# Patient Record
Sex: FEMALE
[De-identification: names, ages, dates, MRNs, and addresses within clinical notes are randomized per-mention and may not be internally consistent; named-entity substitution may affect disease eponyms.]

---

## 2018-08-03 ENCOUNTER — HOSPITAL ENCOUNTER (OUTPATIENT)
Dept: HOSPITAL 62 - LAB | Age: 23
End: 2018-08-03
Attending: NURSE PRACTITIONER
Payer: COMMERCIAL

## 2018-08-03 DIAGNOSIS — N89.8: Primary | ICD-10-CM

## 2018-08-03 LAB
BACTERIA (WET MOUNT): (no result)
EPITHELIALS (WET MOUNT): (no result)
T.VAGINALIS (WET MOUNT): (no result)
WBCS (WET MOUNT): (no result)
YEAST (WET MOUNT): (no result)

## 2018-08-03 PROCEDURE — 87210 SMEAR WET MOUNT SALINE/INK: CPT

## 2019-01-01 ENCOUNTER — HOSPITAL ENCOUNTER (OUTPATIENT)
Dept: HOSPITAL 62 - LAB | Age: 24
End: 2019-01-01
Attending: NURSE PRACTITIONER
Payer: COMMERCIAL

## 2019-01-01 DIAGNOSIS — R30.0: ICD-10-CM

## 2019-01-01 DIAGNOSIS — N89.8: Primary | ICD-10-CM

## 2019-01-01 LAB
BACTERIA (WET MOUNT): (no result)
CHLAM PCR: NOT DETECTED
EPITHELIALS (WET MOUNT): (no result)
GON PCR: NOT DETECTED
RBCS (WET MOUNT): (no result)
T.VAGINALIS (WET MOUNT): (no result)
WBCS (WET MOUNT): (no result)
YEAST (WET MOUNT): (no result)

## 2019-01-01 PROCEDURE — 87086 URINE CULTURE/COLONY COUNT: CPT

## 2019-01-01 PROCEDURE — 87186 SC STD MICRODIL/AGAR DIL: CPT

## 2019-01-01 PROCEDURE — 87591 N.GONORRHOEAE DNA AMP PROB: CPT

## 2019-01-01 PROCEDURE — 87491 CHLMYD TRACH DNA AMP PROBE: CPT

## 2019-01-01 PROCEDURE — 87210 SMEAR WET MOUNT SALINE/INK: CPT

## 2019-01-01 PROCEDURE — 87088 URINE BACTERIA CULTURE: CPT

## 2019-01-13 ENCOUNTER — HOSPITAL ENCOUNTER (OUTPATIENT)
Dept: HOSPITAL 62 - LAB | Age: 24
End: 2019-01-13
Attending: NURSE PRACTITIONER
Payer: COMMERCIAL

## 2019-01-13 DIAGNOSIS — R30.0: Primary | ICD-10-CM

## 2019-01-13 PROCEDURE — 87086 URINE CULTURE/COLONY COUNT: CPT

## 2020-04-10 ENCOUNTER — HOSPITAL ENCOUNTER (EMERGENCY)
Dept: HOSPITAL 62 - ER | Age: 25
Discharge: HOME | End: 2020-04-10
Payer: COMMERCIAL

## 2020-04-10 ENCOUNTER — HOSPITAL ENCOUNTER (OUTPATIENT)
Dept: HOSPITAL 62 - LC | Age: 25
Setting detail: OBSERVATION
LOS: 1 days | Discharge: HOME | End: 2020-04-11
Attending: OBSTETRICS & GYNECOLOGY | Admitting: OBSTETRICS & GYNECOLOGY
Payer: COMMERCIAL

## 2020-04-10 VITALS — SYSTOLIC BLOOD PRESSURE: 127 MMHG | DIASTOLIC BLOOD PRESSURE: 71 MMHG

## 2020-04-10 DIAGNOSIS — M79.10: ICD-10-CM

## 2020-04-10 DIAGNOSIS — O9A.212: ICD-10-CM

## 2020-04-10 DIAGNOSIS — S30.1XXA: ICD-10-CM

## 2020-04-10 DIAGNOSIS — S20.212A: ICD-10-CM

## 2020-04-10 DIAGNOSIS — R07.89: ICD-10-CM

## 2020-04-10 DIAGNOSIS — O9A.212: Primary | ICD-10-CM

## 2020-04-10 DIAGNOSIS — O26.892: ICD-10-CM

## 2020-04-10 DIAGNOSIS — R71.0: ICD-10-CM
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
## 2020-04-10 DIAGNOSIS — Z3A.22: ICD-10-CM
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
## 2020-04-10 DIAGNOSIS — R10.9: ICD-10-CM

## 2020-04-10 DIAGNOSIS — S80.212A: ICD-10-CM

## 2020-04-10 DIAGNOSIS — S00.83XA: ICD-10-CM

## 2020-04-10 DIAGNOSIS — S10.83XA: ICD-10-CM
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
## 2020-04-10 DIAGNOSIS — S93.402A: ICD-10-CM

## 2020-04-10 DIAGNOSIS — R51: ICD-10-CM
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
## 2020-04-10 DIAGNOSIS — S93.602A: ICD-10-CM

## 2020-04-10 DIAGNOSIS — O23.42: Primary | ICD-10-CM

## 2020-04-10 DIAGNOSIS — S90.812A: ICD-10-CM

## 2020-04-10 LAB
ADD MANUAL DIFF: NO
ADD MANUAL DIFF: NO
ANION GAP SERPL CALC-SCNC: 5 MMOL/L (ref 5–19)
APPEARANCE UR: (no result)
APPEARANCE UR: CLEAR
APTT BLD: 26.6 SEC (ref 23.5–35.8)
APTT PPP: YELLOW S
APTT PPP: YELLOW S
BARBITURATES UR QL SCN: NEGATIVE
BASOPHILS # BLD AUTO: 0 10^3/UL (ref 0–0.2)
BASOPHILS # BLD AUTO: 0 10^3/UL (ref 0–0.2)
BASOPHILS NFR BLD AUTO: 0.1 % (ref 0–2)
BASOPHILS NFR BLD AUTO: 0.1 % (ref 0–2)
BILIRUB UR QL STRIP: NEGATIVE
BILIRUB UR QL STRIP: NEGATIVE
BUN SERPL-MCNC: 7 MG/DL (ref 7–20)
CALCIUM: 9.2 MG/DL (ref 8.4–10.2)
CHLORIDE SERPL-SCNC: 107 MMOL/L (ref 98–107)
CO2 SERPL-SCNC: 22 MMOL/L (ref 22–30)
D DIMER PPP FEU-MCNC: 1.45 UG/ML (ref 0–0.5)
EOSINOPHIL # BLD AUTO: 0 10^3/UL (ref 0–0.6)
EOSINOPHIL # BLD AUTO: 0 10^3/UL (ref 0–0.6)
EOSINOPHIL NFR BLD AUTO: 0.1 % (ref 0–6)
EOSINOPHIL NFR BLD AUTO: 0.2 % (ref 0–6)
ERYTHROCYTE [DISTWIDTH] IN BLOOD BY AUTOMATED COUNT: 12.9 % (ref 11.5–14)
ERYTHROCYTE [DISTWIDTH] IN BLOOD BY AUTOMATED COUNT: 13 % (ref 11.5–14)
ERYTHROCYTE [DISTWIDTH] IN BLOOD BY AUTOMATED COUNT: 13 % (ref 11.5–14)
FIBRINOGEN PPP-MCNC: 405 MG/DL (ref 209–497)
GLUCOSE SERPL-MCNC: 95 MG/DL (ref 75–110)
GLUCOSE UR STRIP-MCNC: NEGATIVE MG/DL
GLUCOSE UR STRIP-MCNC: NEGATIVE MG/DL
HCT VFR BLD CALC: 28.2 % (ref 36–47)
HCT VFR BLD CALC: 29.6 % (ref 36–47)
HCT VFR BLD CALC: 32 % (ref 36–47)
HGB BLD-MCNC: 10.1 G/DL (ref 12–15.5)
HGB BLD-MCNC: 10.7 G/DL (ref 12–15.5)
HGB BLD-MCNC: 11.4 G/DL (ref 12–15.5)
INR PPP: 1.02
KETONES UR STRIP-MCNC: 20 MG/DL
KETONES UR STRIP-MCNC: NEGATIVE MG/DL
LYMPHOCYTES # BLD AUTO: 1.3 10^3/UL (ref 0.5–4.7)
LYMPHOCYTES # BLD AUTO: 1.6 10^3/UL (ref 0.5–4.7)
LYMPHOCYTES NFR BLD AUTO: 13.3 % (ref 13–45)
LYMPHOCYTES NFR BLD AUTO: 13.4 % (ref 13–45)
MCH RBC QN AUTO: 31.4 PG (ref 27–33.4)
MCH RBC QN AUTO: 31.4 PG (ref 27–33.4)
MCH RBC QN AUTO: 31.7 PG (ref 27–33.4)
MCHC RBC AUTO-ENTMCNC: 35.6 G/DL (ref 32–36)
MCHC RBC AUTO-ENTMCNC: 35.9 G/DL (ref 32–36)
MCHC RBC AUTO-ENTMCNC: 36 G/DL (ref 32–36)
MCV RBC AUTO: 87 FL (ref 80–97)
MCV RBC AUTO: 88 FL (ref 80–97)
MCV RBC AUTO: 88 FL (ref 80–97)
METHADONE UR QL SCN: NEGATIVE
MONOCYTES # BLD AUTO: 0.5 10^3/UL (ref 0.1–1.4)
MONOCYTES # BLD AUTO: 0.6 10^3/UL (ref 0.1–1.4)
MONOCYTES NFR BLD AUTO: 4.8 % (ref 3–13)
MONOCYTES NFR BLD AUTO: 4.9 % (ref 3–13)
NEUTROPHILS # BLD AUTO: 10 10^3/UL (ref 1.7–8.2)
NEUTROPHILS # BLD AUTO: 8.1 10^3/UL (ref 1.7–8.2)
NEUTS SEG NFR BLD AUTO: 81.4 % (ref 42–78)
NEUTS SEG NFR BLD AUTO: 81.7 % (ref 42–78)
NITRITE UR QL STRIP: NEGATIVE
NITRITE UR QL STRIP: NEGATIVE
PCP UR QL SCN: NEGATIVE
PH UR STRIP: 6 [PH] (ref 5–9)
PH UR STRIP: 8 [PH] (ref 5–9)
PLATELET # BLD: 247 10^3/UL (ref 150–450)
PLATELET # BLD: 269 10^3/UL (ref 150–450)
PLATELET # BLD: 272 10^3/UL (ref 150–450)
POTASSIUM SERPL-SCNC: 4 MMOL/L (ref 3.6–5)
PROT UR STRIP-MCNC: 30 MG/DL
PROT UR STRIP-MCNC: NEGATIVE MG/DL
PROTHROMBIN TIME: 13.4 SEC (ref 11.4–15.4)
RBC # BLD AUTO: 3.2 10^6/UL (ref 3.72–5.28)
RBC # BLD AUTO: 3.4 10^6/UL (ref 3.72–5.28)
RBC # BLD AUTO: 3.63 10^6/UL (ref 3.72–5.28)
RHOGAM DOSE INDICATED: 0 VIAL(S)
SP GR UR STRIP: 1.01
SP GR UR STRIP: > 1.06
TOTAL CELLS COUNTED % (AUTO): 100 %
TOTAL CELLS COUNTED % (AUTO): 100 %
URINE AMPHETAMINES SCREEN: NEGATIVE
URINE BENZODIAZEPINES SCREEN: NEGATIVE
URINE COCAINE SCREEN: NEGATIVE
URINE MARIJUANA (THC) SCREEN: NEGATIVE
UROBILINOGEN UR-MCNC: NEGATIVE MG/DL (ref ?–2)
UROBILINOGEN UR-MCNC: NEGATIVE MG/DL (ref ?–2)
WBC # BLD AUTO: 10 10^3/UL (ref 4–10.5)
WBC # BLD AUTO: 12.2 10^3/UL (ref 4–10.5)
WBC # BLD AUTO: 9.5 10^3/UL (ref 4–10.5)

## 2020-04-10 PROCEDURE — 99284 EMERGENCY DEPT VISIT MOD MDM: CPT

## 2020-04-10 PROCEDURE — 80307 DRUG TEST PRSMV CHEM ANLYZR: CPT

## 2020-04-10 PROCEDURE — 72125 CT NECK SPINE W/O DYE: CPT

## 2020-04-10 PROCEDURE — 81001 URINALYSIS AUTO W/SCOPE: CPT

## 2020-04-10 PROCEDURE — 85027 COMPLETE CBC AUTOMATED: CPT

## 2020-04-10 PROCEDURE — 85730 THROMBOPLASTIN TIME PARTIAL: CPT

## 2020-04-10 PROCEDURE — 80048 BASIC METABOLIC PNL TOTAL CA: CPT

## 2020-04-10 PROCEDURE — 93010 ELECTROCARDIOGRAM REPORT: CPT

## 2020-04-10 PROCEDURE — 36415 COLL VENOUS BLD VENIPUNCTURE: CPT

## 2020-04-10 PROCEDURE — 93005 ELECTROCARDIOGRAM TRACING: CPT

## 2020-04-10 PROCEDURE — 74177 CT ABD & PELVIS W/CONTRAST: CPT

## 2020-04-10 PROCEDURE — 85379 FIBRIN DEGRADATION QUANT: CPT

## 2020-04-10 PROCEDURE — 59899 UNLISTED PX MAT CARE&DLVR: CPT

## 2020-04-10 PROCEDURE — 85610 PROTHROMBIN TIME: CPT

## 2020-04-10 PROCEDURE — G0378 HOSPITAL OBSERVATION PER HR: HCPCS

## 2020-04-10 PROCEDURE — 71046 X-RAY EXAM CHEST 2 VIEWS: CPT

## 2020-04-10 PROCEDURE — 85025 COMPLETE CBC W/AUTO DIFF WBC: CPT

## 2020-04-10 PROCEDURE — 85384 FIBRINOGEN ACTIVITY: CPT

## 2020-04-10 PROCEDURE — 87086 URINE CULTURE/COLONY COUNT: CPT

## 2020-04-10 PROCEDURE — 76815 OB US LIMITED FETUS(S): CPT

## 2020-04-10 PROCEDURE — 85460 HEMOGLOBIN FETAL: CPT

## 2020-04-10 PROCEDURE — 71260 CT THORAX DX C+: CPT

## 2020-04-10 RX ADMIN — Medication SCH ML: at 21:56

## 2020-04-10 RX ADMIN — Medication SCH: at 18:50
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
## 2020-04-11 VITALS — SYSTOLIC BLOOD PRESSURE: 108 MMHG | DIASTOLIC BLOOD PRESSURE: 62 MMHG

## 2020-04-11 LAB
ERYTHROCYTE [DISTWIDTH] IN BLOOD BY AUTOMATED COUNT: 12.9 % (ref 11.5–14)
HCT VFR BLD CALC: 26.6 % (ref 36–47)
HGB BLD-MCNC: 9.7 G/DL (ref 12–15.5)
MCH RBC QN AUTO: 31.8 PG (ref 27–33.4)
MCHC RBC AUTO-ENTMCNC: 36.4 G/DL (ref 32–36)
MCV RBC AUTO: 87 FL (ref 80–97)
PLATELET # BLD: 253 10^3/UL (ref 150–450)
RBC # BLD AUTO: 3.04 10^6/UL (ref 3.72–5.28)
WBC # BLD AUTO: 8.1 10^3/UL (ref 4–10.5)
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
## 2020-08-20 ENCOUNTER — HOSPITAL ENCOUNTER (INPATIENT)
Dept: HOSPITAL 62 - LC | Age: 25
LOS: 1 days | Discharge: HOME | End: 2020-08-21
Attending: OBSTETRICS & GYNECOLOGY | Admitting: OBSTETRICS & GYNECOLOGY
Payer: MEDICAID

## 2020-08-20 DIAGNOSIS — S90.32XA: ICD-10-CM

## 2020-08-20 DIAGNOSIS — S80.02XA: ICD-10-CM

## 2020-08-20 DIAGNOSIS — S10.93XA: ICD-10-CM

## 2020-08-20 DIAGNOSIS — D50.9: ICD-10-CM

## 2020-08-20 DIAGNOSIS — S40.012A: ICD-10-CM

## 2020-08-20 DIAGNOSIS — S30.1XXA: ICD-10-CM

## 2020-08-20 DIAGNOSIS — Z3A.40: ICD-10-CM
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
## 2020-08-20 DIAGNOSIS — R07.89: ICD-10-CM

## 2020-08-20 DIAGNOSIS — S00.83XA: ICD-10-CM

## 2020-08-20 DIAGNOSIS — Z11.59: ICD-10-CM

## 2020-08-20 DIAGNOSIS — O99.89: Primary | ICD-10-CM
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
## 2020-08-20 LAB
ADD MANUAL DIFF: NO
APPEARANCE UR: CLEAR
APTT PPP: YELLOW S
BARBITURATES UR QL SCN: NEGATIVE
BASOPHILS # BLD AUTO: 0 10^3/UL (ref 0–0.2)
BASOPHILS NFR BLD AUTO: 0.2 % (ref 0–2)
BILIRUB UR QL STRIP: NEGATIVE
EOSINOPHIL # BLD AUTO: 0 10^3/UL (ref 0–0.6)
EOSINOPHIL NFR BLD AUTO: 0.2 % (ref 0–6)
ERYTHROCYTE [DISTWIDTH] IN BLOOD BY AUTOMATED COUNT: 16.8 % (ref 11.5–14)
GLUCOSE UR STRIP-MCNC: NEGATIVE MG/DL
HCT VFR BLD CALC: 30.7 % (ref 36–47)
HGB BLD-MCNC: 9.7 G/DL (ref 12–15.5)
KETONES UR STRIP-MCNC: NEGATIVE MG/DL
LYMPHOCYTES # BLD AUTO: 2 10^3/UL (ref 0.5–4.7)
LYMPHOCYTES NFR BLD AUTO: 16.8 % (ref 13–45)
MCH RBC QN AUTO: 25 PG (ref 27–33.4)
MCHC RBC AUTO-ENTMCNC: 31.7 G/DL (ref 32–36)
MCV RBC AUTO: 79 FL (ref 80–97)
METHADONE UR QL SCN: NEGATIVE
MONOCYTES # BLD AUTO: 0.6 10^3/UL (ref 0.1–1.4)
MONOCYTES NFR BLD AUTO: 5.4 % (ref 3–13)
NEUTROPHILS # BLD AUTO: 9.1 10^3/UL (ref 1.7–8.2)
NEUTS SEG NFR BLD AUTO: 77.4 % (ref 42–78)
NITRITE UR QL STRIP: NEGATIVE
PCP UR QL SCN: NEGATIVE
PH UR STRIP: 7 [PH] (ref 5–9)
PLATELET # BLD: 196 10^3/UL (ref 150–450)
PROT UR STRIP-MCNC: NEGATIVE MG/DL
RBC # BLD AUTO: 3.89 10^6/UL (ref 3.72–5.28)
SP GR UR STRIP: 1.01
TOTAL CELLS COUNTED % (AUTO): 100 %
URINE AMPHETAMINES SCREEN: NEGATIVE
URINE BENZODIAZEPINES SCREEN: NEGATIVE
URINE COCAINE SCREEN: NEGATIVE
URINE MARIJUANA (THC) SCREEN: NEGATIVE
UROBILINOGEN UR-MCNC: NEGATIVE MG/DL (ref ?–2)
WBC # BLD AUTO: 11.8 10^3/UL (ref 4–10.5)

## 2020-08-20 PROCEDURE — 81005 URINALYSIS: CPT

## 2020-08-20 PROCEDURE — 94760 N-INVAS EAR/PLS OXIMETRY 1: CPT

## 2020-08-20 PROCEDURE — 84112 EVAL AMNIOTIC FLUID PROTEIN: CPT

## 2020-08-20 PROCEDURE — 86900 BLOOD TYPING SEROLOGIC ABO: CPT

## 2020-08-20 PROCEDURE — 36415 COLL VENOUS BLD VENIPUNCTURE: CPT

## 2020-08-20 PROCEDURE — 86850 RBC ANTIBODY SCREEN: CPT

## 2020-08-20 PROCEDURE — 0HQ9XZZ REPAIR PERINEUM SKIN, EXTERNAL APPROACH: ICD-10-PCS | Performed by: OBSTETRICS & GYNECOLOGY

## 2020-08-20 PROCEDURE — 85027 COMPLETE CBC AUTOMATED: CPT

## 2020-08-20 PROCEDURE — C1758 CATHETER, URETERAL: HCPCS

## 2020-08-20 PROCEDURE — 86592 SYPHILIS TEST NON-TREP QUAL: CPT

## 2020-08-20 PROCEDURE — 87635 SARS-COV-2 COVID-19 AMP PRB: CPT

## 2020-08-20 PROCEDURE — C9803 HOPD COVID-19 SPEC COLLECT: HCPCS

## 2020-08-20 PROCEDURE — 80307 DRUG TEST PRSMV CHEM ANLYZR: CPT

## 2020-08-20 PROCEDURE — 85025 COMPLETE CBC W/AUTO DIFF WBC: CPT

## 2020-08-20 PROCEDURE — 86901 BLOOD TYPING SEROLOGIC RH(D): CPT

## 2020-08-20 RX ADMIN — PENICILLIN G POTASSIUM SCH MLS/HR: 5000000 POWDER, FOR SOLUTION INTRAMUSCULAR; INTRAPLEURAL; INTRATHECAL; INTRAVENOUS at 10:34

## 2020-08-20 RX ADMIN — FAMOTIDINE SCH MG: 20 TABLET, FILM COATED ORAL at 22:26

## 2020-08-20 RX ADMIN — FERROUS SULFATE TAB 325 MG (65 MG ELEMENTAL FE) SCH MG: 325 (65 FE) TAB at 20:15

## 2020-08-20 RX ADMIN — DOCUSATE SODIUM SCH MG: 100 CAPSULE, LIQUID FILLED ORAL at 20:15

## 2020-08-20 RX ADMIN — ACETAMINOPHEN AND CODEINE PHOSPHATE PRN EACH: 300; 30 TABLET ORAL at 22:26

## 2020-08-20 RX ADMIN — PENICILLIN G POTASSIUM SCH MLS/HR: 5000000 POWDER, FOR SOLUTION INTRAMUSCULAR; INTRAPLEURAL; INTRATHECAL; INTRAVENOUS at 14:08

## 2020-08-20 RX ADMIN — SODIUM CHLORIDE, SODIUM LACTATE, POTASSIUM CHLORIDE, AND CALCIUM CHLORIDE PRN MLS/HR: .6; .31; .03; .02 INJECTION, SOLUTION INTRAVENOUS at 06:56

## 2020-08-20 RX ADMIN — SODIUM CHLORIDE, SODIUM LACTATE, POTASSIUM CHLORIDE, AND CALCIUM CHLORIDE PRN MLS/HR: .6; .31; .03; .02 INJECTION, SOLUTION INTRAVENOUS at 10:36
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
## 2020-08-20 NOTE — L&D PROGRESS NOTES
=================================================================

PROGRESS NOTES

=================================================================

Datetime Report Generated by CPN: 08/20/2020 07:07

   

   

=================================================================

PROGRESS NOTE

=================================================================

   

Informed Consent Obtained:  Vaginal Delivery

Comment:  The patient presents with ruptured membranes.  The estimated

   fetal weight is 9 lb 11 oz.  We have discussed the risks of

   delivering a large baby vaginally including vaginal tears, rectal

   tears and shoulder dystocia with injury to baby including Erb's

   palsey.  She wishes to proceed with a vaginal delivery at this time.

   We discussed that if she changes her mind we can change to a c

   section for delivery.

   

=================================================================

VAGINAL EXAM

=================================================================

   

Dilatation:  1

Effacement:  0

Station:  -3

   

=================================================================

LAST VAGINAL EXAM-NURSING

=================================================================

   

Nursing Exam Dilitation:  1.0

Nursing Exam Effacement:  thick

Nursing Exam Station:  -3

Nursing Exam Contractions:  states does not feel any cramping or pain

   

=================================================================

MEMBRANES

=================================================================

   

Pooling:  Positive

Membranes:  Ruptured

   

=================================================================

FETUS A

=================================================================

   

:  40.0

Estimated Fetal Weight (gm):  3500

Fetal Presentation:  Vertex

Fetal Presentation- Other:  variable

   

=================================================================

SIGNATURE

=================================================================

   

SIGNATURE:  [id number]

Signature:  Electronically signed by All Torres MD ([email or web address]) on

   8/20/2020 at 07:06  with User ID: [email or web address]

## 2020-08-20 NOTE — L&D PROGRESS NOTES
=================================================================

PROGRESS NOTES

=================================================================

Datetime Report Generated by CPN: 08/20/2020 15:26

   

   

=================================================================

PROGRESS NOTE

=================================================================

   

Impression:  Normal Progression of Labor

Plan:  Continue Present Management

Informed Consent Obtained:  Vaginal Delivery

Comment:  9 cm and complaining of pressure w each contraction. 

   Anticipate pushing shortly.

   

=================================================================

VAGINAL EXAM

=================================================================

   

Dilatation:  1

Effacement:  0

Station:  -3

   

=================================================================

LAST VAGINAL EXAM-NURSING

=================================================================

   

Nursing Exam Dilitation:  7.0

Nursing Exam Effacement:  80

Nursing Exam Station:  0

Nursing Exam Contractions:  states does not feel any cramping or pain

   

=================================================================

MEMBRANES

=================================================================

   

Pooling:  Positive

Membranes:  Ruptured

   

=================================================================

FETUS A

=================================================================

   

:  40.0

Estimated Fetal Weight (gm):  3500

Fetal Presentation:  Vertex

Fetal Presentation- Other:  variable

   

=================================================================

SIGNATURE

=================================================================

   

SIGNATURE:  [id number]

Assignment:  Daryn Sharma MD

Signature:  Electronically signed by Betzaida Trejo CNM on 8/20/2020 at

   15:26  with User ID: [email or web address]

:  Electronically signed by Betzaida Trejo CNM on 8/20/2020 at 15:26 

   with User ID: [email or web address]

:  I personally evaluated and examined the patient in conjunction with

   the MLP and agree with the assessment, treatment plan and

   disposition.

## 2020-08-21 VITALS — SYSTOLIC BLOOD PRESSURE: 107 MMHG | DIASTOLIC BLOOD PRESSURE: 62 MMHG

## 2020-08-21 LAB
ERYTHROCYTE [DISTWIDTH] IN BLOOD BY AUTOMATED COUNT: 16.8 % (ref 11.5–14)
HCT VFR BLD CALC: 23.7 % (ref 36–47)
HGB BLD-MCNC: 7.9 G/DL (ref 12–15.5)
MCH RBC QN AUTO: 25.6 PG (ref 27–33.4)
MCHC RBC AUTO-ENTMCNC: 33.3 G/DL (ref 32–36)
MCV RBC AUTO: 77 FL (ref 80–97)
PLATELET # BLD: 187 10^3/UL (ref 150–450)
RBC # BLD AUTO: 3.08 10^6/UL (ref 3.72–5.28)
WBC # BLD AUTO: 12.7 10^3/UL (ref 4–10.5)

## 2020-08-21 RX ADMIN — IBUPROFEN SCH MG: 800 TABLET, FILM COATED ORAL at 02:04

## 2020-08-21 RX ADMIN — ACETAMINOPHEN AND CODEINE PHOSPHATE PRN EACH: 300; 30 TABLET ORAL at 05:56

## 2020-08-21 RX ADMIN — DOCUSATE SODIUM SCH MG: 100 CAPSULE, LIQUID FILLED ORAL at 09:10

## 2020-08-21 RX ADMIN — FERROUS SULFATE TAB 325 MG (65 MG ELEMENTAL FE) SCH MG: 325 (65 FE) TAB at 09:10

## 2020-08-21 RX ADMIN — FAMOTIDINE SCH MG: 20 TABLET, FILM COATED ORAL at 09:10

## 2020-08-21 RX ADMIN — IBUPROFEN SCH MG: 800 TABLET, FILM COATED ORAL at 09:10

## 2020-08-21 NOTE — DELIVERY SUMMARY
=================================================================

Del Sum A-C

=================================================================

Datetime Report Generated by CPN: 2020 12:04

   

   

=================================================================

DELIVERY PERSONNEL

=================================================================

   

DELIVERY PERSONNEL:  [id number]

Delivery Doctor::  Daryn Sharma MD

Labor and Delivery Nurse::  Jaqueline Mcneal RN

Labor and Delivery Nurse::  Berkley Clancy RNC

Nursery Nurse::  Toya England RN

Nursery Nurse::  Etelvina Yoon RN

Scrub Tech/CNA:  Bethanierubina Wyatt, CST

Scrub Tech/CNA:  Vickie Briggs, CST

   

=================================================================

MATERNAL INFORMATION

=================================================================

   

Delivery Anesthesia:  Epidural

Medications After Delivery:  Pitocin 30 Units in 500ml NS/D5W

Delivery QBL:  250

Maternal Complications:  None; Other

   

=================================================================

LABOR SUMMARY

=================================================================

   

EDC:  2020 00:00

No. Babies in Womb:  1

 Attempted:  No

Labor Anesthesia:  Epidural

   

=================================================================

LABOR INFORMATION

=================================================================

   

Reason for Induction:  Not Applicable

Onset of Labor:  2020 11:00

Complete Dilatation:  2020 15:33

Oxytocin:  Augmentation

Group B Beta Strep:  pos

Antibiotics # of Doses:  3

Antibiotics Time of Last Dose:  2020 14:08

Name of Antibiotic Given:  pcn

Steroids Given:  None

Reason Steroids Not Administered:  Not Applicable

   

=================================================================

MEMBRANES

=================================================================

   

Membranes Rupture Method:  Spontaneous

Rupture of Membranes:  2020 11:00

Length of Rupture (hr):  29.30

Amniotic Fluid Color:  Moderate Meconium

Amniotic Fluid Amount:  Small

Amniotic Fluid Odor:  None

   

=================================================================

STAGES OF LABOR

=================================================================

   

Stage 1 hr:  4

Stage 1 min:  33

Stage 2 hr:  0

Stage 2 min:  45

Stage 3 hr:  0

Stage 3 min:  3

Total Time in Labor hr:  5

Total Time in Labor min:  21

   

=================================================================

VAGINAL DELIVERY

=================================================================

   

Episiotomy:  None

Laceration #1:  Vaginal

Laceration Extension #1:  N/A

Laceration Repair:  Yes

Laceration Repair Note:  Repaired with 2-0 vicryl

Sponge Count Correct:  N/A

Sharps Count Correct:  N/A

   

=================================================================

CSECTION DELIVERY

=================================================================

   

Primary Indication:  N/A

Secondary Indication:  N/A

CSection Incidence:  N/A

Labor:  N/A

Elective:  N/A

CSection Incision:  N/A

   

=================================================================

BABY A INFORMATION

=================================================================

   

Infant Delivery Date/Time:  2020 16:18

Method of Delivery:  Vaginal

Nurse Controlled Delivery:  No

Born in Route :  No

:  N/A

Forceps:  N/A

Vacuum Extraction:  N/A

Shoulder Dystocia :  No

   

=================================================================

PRESENTATION/POSITION BABY A

=================================================================

   

Presentation:  Cephalic

Cephalic Presentation:  Vertex

Vertex Position:  Left Occipital Posterior

Breech Presentation:  N/A

   

=================================================================

PLACENTA INFORMATION BABY A

=================================================================

   

Placenta Delivery Time :  2020 16:21

Placenta Method of Delivery:  Spontaneous

Placenta Status:  Delivered

   

=================================================================

APGAR SCORES BABY A

=================================================================

   

Heart Rate 1 min:  >100 bpm

Resp Effort 1 min:  Slow, Irregular

Reflex Irritability 1 min:  No Response

Muscle Tone 1 min:  Flaccid

Color 1 min:  Blue/Pale

Resuscitation Effort 1 min:  Tactile Stimulation; PPV/NCPAP

APGAR SCORE 1 MIN:  3

Heart Rate 5 min:  >100 bpm

Resp Effort 5 min:  Slow, Irregular

Reflex Irritability 5 min:  Grimace

Muscle Tone 5 min:  Some Flexion of Extremities

Color 5 min:  Completely Pink

Resuscitation Effort 5 min:  Oxygen; PPV/NCPAP

APGAR SCORE 5 MIN:  7

Resuscitation Effort 10 min:  N/A

   

=================================================================

INFANT INFORMATION BABY A

=================================================================

   

Gestational Age at Delivery:  40.0

Gestational Status:  Full Term- 39- 40.6 Weeks

Infant Outcome :  Liveborn

Infant Condition :  Stable

Infant Sex:  Female

   

=================================================================

IDENTIFICATION BABY A

=================================================================

   

Infant Verification Date/Time:  2020 17:16

ID Band Number:  [id number]

Mother's Name Verified:  Yes

Infant Medical Record Number:  [id number]

RN Verifying Infant:  DONNA CastanoSHRUTHI

Additional Verifying Personnel:  SARINA Mcneal RN

   

=================================================================

WEIGHT/LENGTH BABY A

=================================================================

   

Infant Birthweight (gm):  4022

Infant Weight (lb):  8

Infant Weight (oz):  14

Infant Length (in):  21.00

Infant Length (cm):  53.34

   

=================================================================

CORD INFORMATION BABY A

=================================================================

   

No. Cord Vessels:  3

Nuchal Cord :  Around Neck x1, Tight

Cord Blood Taken:  Yes-For Storage (Mom's Blood type +)

Infant Suction:  Mouth; Nose

   

=================================================================

ASSESSMENT BABY A

=================================================================

   

Infant Complications:  Meconium

Physical Findings at Delivery:  Within Normal Limits

Skin to Skin:  Yes

Transferred To:  NICU

   

=================================================================

BABY B INFORMATION

=================================================================

   

 :  N/A

   

=================================================================

SIGNATURES

=================================================================

   

Signature:  Electronically signed by Daryn Sharma, MD ([employer]) on

   2020 at 16:36  with User ID: [email or web address]

:  I personally evaluated and examined the patient in conjunction with

   the MLP and agree with the assessment, treatment plan and

   disposition.
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
## 2020-08-21 NOTE — BIRTH CERTIFICATE DATA
=================================================================

Birth Cert Data

=================================================================

Datetime Report Generated by CPN: 2020 12:04

   

   

=================================================================

BIRTH CERTIFICATE DATA

=================================================================

   

 47a. Prenatal Care:  Yes    (04/10/2020 11:43:Anamaria Brown RN)

 47b. Date of First Visit:  2020 00:00    (04/10/2020

   11:43:Jaqueline Mcneal RN)

 47c. Date of Last Visit:  2020 00:00    (04/10/2020 11:43:Jaqueline Mcneal RN)

 47d. Number of Prenatal Visits:  10    (04/10/2020 11:43:Jaqueline Mcneal RN)

 48a. Number of Prev Live Births:  0    (04/10/2020 11:43:Jaqueline Mcneal RN)

 48b. Now Livin    (04/10/2020 11:43:Anamaria Brown RN)

 48c. Live Births Now Dead:  0    (04/10/2020 11:43:QS system process)

 48e. Pregnancy Losses:  0    (04/10/2020 11:43:Jaqueline Mcneal RN)

   

=================================================================

RISK FACTORS IN THIS PREGNANCY

=================================================================

   

 49a. Diabetes:  No    (04/10/2020 11:43:Anamaria Brown RN)

 49b. Hypertension:  No    (04/10/2020 11:43:Anamaria Brown RN)

 49c. Previous  Births:  0    (04/10/2020 11:43:Anamaria Brown RN)

 49d. Stillborns:  No    (04/10/2020 11:43:Anamaria Brown RN)

 49d. IUGR:  No    (04/10/2020 11:43:Anamaria Brown RN)

 49e. Infertility Treatment:  No    (04/10/2020 11:43:Anamaria Brown RN)

 49f. Previous Cesareans:  0    (04/10/2020 11:43:Jaqueline Mcneal RN)

   

=================================================================

Mother's Height

=================================================================

   

 50b. Height Inches:  68    (2020 20:03:QS system process)

   

=================================================================

Mother's Weight 

=================================================================

   

 51a. Pre-Pregnancy Weight (lbs):  190    (04/10/2020 11:43:Anamaria Brown RN)

 51b. Weight at Delivery (lbs):  227    (2020 20:03:QS system

   process)

 52. Dt Last Normal Menses Began:  2019 00:00    (04/10/2020

   11:43:Yarelis Diez MD ([facility]))

   

=================================================================

Infections Present/Treated

=================================================================

   

 53a. Gonorrhea:  No    (04/10/2020 11:43:Anamaria Brown RN)

 Results this Hospital Visit :  Negative    (04/10/2020 11:43:Natalie Jo RN)

 53b. Syphilis:  No    (04/10/2020 11:43:Anamaria Brown RN)

 Results this Hospital Visit:  NONREACTIVE    (2020 07:07:QS

   system process)

 53c. Chlamydia:  No    (04/10/2020 11:43:Anamaria Brown RN)

 Results this Hospital Visit:  Negative (Annotations: Data stored by

   [facility] on behalf of user)    (04/10/2020 11:43:Natalie Jo RN)

 53d. Hepatitis B:  No    (04/10/2020 11:43:Anamaria Brown RN)

 Results this Hospital Visit:  Negative    (04/10/2020 11:43:Natalie Jo RN)

 53e. Hepatitis C:  Negative    (04/10/2020 11:43:Jaqueline Mcneal RN)

 53i. Date Tested:  02/10/2020 00:00    (04/10/2020 11:43:Jaqueline Mcneal RN)

 53j. Test Result:  Negative    (04/10/2020 11:43:Natalie Jo RN)

   

=================================================================

Obstetric Procedures 

=================================================================

   

 54a, b, c. Obstetric Procedures:  Ultrasound    (04/10/2020

   11:43:Anamaria Brown RN)

   

=================================================================

Onset of Labor

=================================================================

   

 56a. PROM >12 Hrs:  29.30    (04/10/2020 11:43:QS system process)

 56b. Precipitous Labor <3 Hrs:  5    (04/10/2020 11:43:QS system

   process)

 56c. Prolonged Labor > 20 Hrs:  5    (04/10/2020 11:43:QS system

   process)

   

=================================================================



=================================================================

   

 57a. Induction of Labor:  Augmentation    (04/10/2020 11:43:Kyra Valdes RN)

 57c. Non-Vertex Presentation A:  Vertex    (04/10/2020 11:43:Kyra Valdes RN)

 57d. Steroids - Fetal Lung Mat:  None    (04/10/2020 11:43:Jaqueline Mcneal RN)

 57d. Steroids - Fetal Lung Mat:  Not Applicable    (04/10/2020

   11:43:Jaqueline Mcneal RN)

 57e. Antibiotics During Labor:  2020 14:08    (04/10/2020

   11:43:Kyra Valdes RN)

 57g. Moderate/Heavy Meconium:  Moderate Meconium    (2020

   10:37:Jaqueline Mcneal RN)

 57h. Fetal Intolerance of Labor:  N/A    (04/10/2020 11:43:Kyra Valdes RN)

:  N/A    (04/10/2020 11:43:Kyra Valdes RN)

 57i. Epidural/Spinal Anesthesia:  Epidural    (04/10/2020 11:43:Kyra Valdes RN)

   

=================================================================

Method of Delivery

=================================================================

   

 58a. Forceps - Unsuccessful A:  N/A    (04/10/2020 11:43:Kyra Valdes RN)

 58b. Vacuum - Unsuccessful A:  N/A    (04/10/2020 11:43:Kyra Valdes RN)

   

=================================================================

58c. Presentation at Birth

=================================================================

   

 58c. Presentation at Birth - A :  Vertex    (04/10/2020 11:43:Kyra Valdes RN)

 58c. Presentation at Birth - A :  N/A    (04/10/2020 11:43:Kyra Valdes RN)

 58c. Presentation at Birth - A :  Cephalic    (04/10/2020 11:43:Kyra Valdes RN)

   

=================================================================

Final Route and Method of Del 

=================================================================

   

 58d. Baby A Route/Delivery:  Vaginal    (2020 16:18:Jaqueline Mcneal RN)

 58e. Trial of Labor Attempted:  No    (04/10/2020 11:43:Jaqueline Mcneal RN)

 58e. Trial of Labor Attempted A:  N/A    (04/10/2020 11:43:Jaqueline Mcneal RN)

 58e. Trial of Labor Attempted B:  N/A    (04/10/2020 11:43:Jaqueline

   Fredis, RN)

   

=================================================================

Maternal Morbidity

=================================================================

   

 59b. 3rd or 4th Degree Lacs:  Vaginal    (04/10/2020 11:43:Daryn

   Sharma, MD ([facility]))

   

=================================================================



=================================================================

   

 Birthweight Baby A:  4022    (04/10/2020 11:43:Jaquelinevivek Mcneal RN)

 60a. Pounds :  8    (04/10/2020 11:43:QS system process)

 60b. Ounces:  14    (04/10/2020 11:43:QS system process)

   

=================================================================

61. GA at Delivery 

=================================================================

   

 Baby A:  40.0    (04/10/2020 11:43:April SHRUTHI Valdes)

:  Full Term- 39- 40.6 Weeks    (04/10/2020 11:43:QS system process)

   

=================================================================

62a. APGAR 5 Minute

=================================================================

   

 Baby A:  7    (04/10/2020 11:43:QS system process)